# Patient Record
Sex: FEMALE | Employment: UNEMPLOYED | URBAN - METROPOLITAN AREA
[De-identification: names, ages, dates, MRNs, and addresses within clinical notes are randomized per-mention and may not be internally consistent; named-entity substitution may affect disease eponyms.]

---

## 2023-07-24 ENCOUNTER — HOSPITAL ENCOUNTER (OUTPATIENT)
Dept: GENERAL RADIOLOGY | Age: 48
Discharge: HOME | End: 2023-07-24
Attending: INTERNAL MEDICINE
Payer: MEDICAID

## 2023-07-24 ENCOUNTER — TELEPHONE (OUTPATIENT)
Dept: RHEUMATOLOGY | Facility: CLINIC | Age: 48
End: 2023-07-24

## 2023-07-24 ENCOUNTER — OFFICE VISIT (OUTPATIENT)
Dept: RHEUMATOLOGY | Facility: CLINIC | Age: 48
End: 2023-07-24
Payer: MEDICAID

## 2023-07-24 ENCOUNTER — HOSPITAL ENCOUNTER (OUTPATIENT)
Dept: GENERAL RADIOLOGY | Age: 48
End: 2023-07-24
Attending: INTERNAL MEDICINE
Payer: MEDICAID

## 2023-07-24 ENCOUNTER — LAB ENCOUNTER (OUTPATIENT)
Dept: LAB | Age: 48
End: 2023-07-24
Attending: INTERNAL MEDICINE
Payer: MEDICAID

## 2023-07-24 VITALS
RESPIRATION RATE: 16 BRPM | TEMPERATURE: 98 F | BODY MASS INDEX: 27.15 KG/M2 | DIASTOLIC BLOOD PRESSURE: 94 MMHG | SYSTOLIC BLOOD PRESSURE: 142 MMHG | WEIGHT: 173 LBS | HEIGHT: 67 IN | HEART RATE: 76 BPM

## 2023-07-24 DIAGNOSIS — L40.50 PSORIATIC ARTHRITIS (HCC): ICD-10-CM

## 2023-07-24 DIAGNOSIS — M79.18 MYOFASCIAL PAIN DYSFUNCTION SYNDROME: ICD-10-CM

## 2023-07-24 DIAGNOSIS — M79.18 DIFFUSE MYOFASCIAL PAIN SYNDROME: Primary | ICD-10-CM

## 2023-07-24 DIAGNOSIS — M79.18 MYOFASCIAL PAIN DYSFUNCTION SYNDROME: Primary | ICD-10-CM

## 2023-07-24 LAB
BILIRUB UR QL STRIP.AUTO: NEGATIVE
C3 SERPL-MCNC: 173 MG/DL (ref 90–180)
C4 SERPL-MCNC: 50.2 MG/DL (ref 10–40)
CK SERPL-CCNC: 45 U/L
COLOR UR AUTO: YELLOW
CREAT UR-SCNC: 132 MG/DL
CRP SERPL-MCNC: 0.32 MG/DL (ref ?–0.3)
ERYTHROCYTE [SEDIMENTATION RATE] IN BLOOD: 37 MM/HR
GLUCOSE UR STRIP.AUTO-MCNC: NEGATIVE MG/DL
HBV SURFACE AB SER QL: NONREACTIVE
HBV SURFACE AB SERPL IA-ACNC: <3.1 MIU/ML
HBV SURFACE AG SER-ACNC: 0.26 [IU]/L
HBV SURFACE AG SERPL QL IA: NONREACTIVE
KETONES UR STRIP.AUTO-MCNC: NEGATIVE MG/DL
LEUKOCYTE ESTERASE UR QL STRIP.AUTO: NEGATIVE
NITRITE UR QL STRIP.AUTO: NEGATIVE
PH UR STRIP.AUTO: 5 [PH] (ref 5–8)
PROT UR STRIP.AUTO-MCNC: NEGATIVE MG/DL
PROT UR-MCNC: 16.2 MG/DL
PROT/CREAT UR-RTO: 0.12
PTH-INTACT SERPL-MCNC: 33.9 PG/ML (ref 18.5–88)
RBC UR QL AUTO: NEGATIVE
RHEUMATOID FACT SERPL-ACNC: <10 IU/ML (ref ?–15)
SP GR UR STRIP.AUTO: 1.02 (ref 1–1.03)
T4 FREE SERPL-MCNC: 1.1 NG/DL (ref 0.8–1.7)
TSI SER-ACNC: 3.98 MIU/ML (ref 0.36–3.74)
URATE SERPL-MCNC: 5.5 MG/DL
UROBILINOGEN UR STRIP.AUTO-MCNC: <2 MG/DL
VIT B12 SERPL-MCNC: 906 PG/ML (ref 193–986)
VIT D+METAB SERPL-MCNC: 46.3 NG/ML (ref 30–100)

## 2023-07-24 PROCEDURE — 86200 CCP ANTIBODY: CPT | Performed by: INTERNAL MEDICINE

## 2023-07-24 PROCEDURE — 3077F SYST BP >= 140 MM HG: CPT | Performed by: INTERNAL MEDICINE

## 2023-07-24 PROCEDURE — 86334 IMMUNOFIX E-PHORESIS SERUM: CPT

## 2023-07-24 PROCEDURE — 82550 ASSAY OF CK (CPK): CPT

## 2023-07-24 PROCEDURE — 86225 DNA ANTIBODY NATIVE: CPT

## 2023-07-24 PROCEDURE — 86038 ANTINUCLEAR ANTIBODIES: CPT

## 2023-07-24 PROCEDURE — 73562 X-RAY EXAM OF KNEE 3: CPT | Performed by: INTERNAL MEDICINE

## 2023-07-24 PROCEDURE — 86235 NUCLEAR ANTIGEN ANTIBODY: CPT

## 2023-07-24 PROCEDURE — 86480 TB TEST CELL IMMUN MEASURE: CPT

## 2023-07-24 PROCEDURE — 84439 ASSAY OF FREE THYROXINE: CPT

## 2023-07-24 PROCEDURE — 99205 OFFICE O/P NEW HI 60 MIN: CPT | Performed by: INTERNAL MEDICINE

## 2023-07-24 PROCEDURE — 85610 PROTHROMBIN TIME: CPT

## 2023-07-24 PROCEDURE — 83970 ASSAY OF PARATHORMONE: CPT

## 2023-07-24 PROCEDURE — 3008F BODY MASS INDEX DOCD: CPT | Performed by: INTERNAL MEDICINE

## 2023-07-24 PROCEDURE — 86431 RHEUMATOID FACTOR QUANT: CPT

## 2023-07-24 PROCEDURE — 36415 COLL VENOUS BLD VENIPUNCTURE: CPT

## 2023-07-24 PROCEDURE — 82607 VITAMIN B-12: CPT

## 2023-07-24 PROCEDURE — 87340 HEPATITIS B SURFACE AG IA: CPT

## 2023-07-24 PROCEDURE — 86039 ANTINUCLEAR ANTIBODIES (ANA): CPT

## 2023-07-24 PROCEDURE — 86706 HEP B SURFACE ANTIBODY: CPT

## 2023-07-24 PROCEDURE — 86803 HEPATITIS C AB TEST: CPT

## 2023-07-24 PROCEDURE — 82570 ASSAY OF URINE CREATININE: CPT

## 2023-07-24 PROCEDURE — 81001 URINALYSIS AUTO W/SCOPE: CPT

## 2023-07-24 PROCEDURE — 72110 X-RAY EXAM L-2 SPINE 4/>VWS: CPT | Performed by: INTERNAL MEDICINE

## 2023-07-24 PROCEDURE — 86147 CARDIOLIPIN ANTIBODY EA IG: CPT

## 2023-07-24 PROCEDURE — 86140 C-REACTIVE PROTEIN: CPT

## 2023-07-24 PROCEDURE — 73523 X-RAY EXAM HIPS BI 5/> VIEWS: CPT | Performed by: INTERNAL MEDICINE

## 2023-07-24 PROCEDURE — 71046 X-RAY EXAM CHEST 2 VIEWS: CPT | Performed by: INTERNAL MEDICINE

## 2023-07-24 PROCEDURE — 82306 VITAMIN D 25 HYDROXY: CPT

## 2023-07-24 PROCEDURE — 84165 PROTEIN E-PHORESIS SERUM: CPT

## 2023-07-24 PROCEDURE — 86146 BETA-2 GLYCOPROTEIN ANTIBODY: CPT

## 2023-07-24 PROCEDURE — 85652 RBC SED RATE AUTOMATED: CPT

## 2023-07-24 PROCEDURE — 73130 X-RAY EXAM OF HAND: CPT | Performed by: INTERNAL MEDICINE

## 2023-07-24 PROCEDURE — 73630 X-RAY EXAM OF FOOT: CPT | Performed by: INTERNAL MEDICINE

## 2023-07-24 PROCEDURE — 86160 COMPLEMENT ANTIGEN: CPT

## 2023-07-24 PROCEDURE — 84156 ASSAY OF PROTEIN URINE: CPT

## 2023-07-24 PROCEDURE — 84443 ASSAY THYROID STIM HORMONE: CPT

## 2023-07-24 PROCEDURE — 3080F DIAST BP >= 90 MM HG: CPT | Performed by: INTERNAL MEDICINE

## 2023-07-24 PROCEDURE — 84550 ASSAY OF BLOOD/URIC ACID: CPT

## 2023-07-24 PROCEDURE — 72040 X-RAY EXAM NECK SPINE 2-3 VW: CPT | Performed by: INTERNAL MEDICINE

## 2023-07-24 RX ORDER — TRAZODONE HYDROCHLORIDE 100 MG/1
2 TABLET ORAL NIGHTLY PRN
COMMUNITY
Start: 2023-06-05

## 2023-07-24 RX ORDER — HYDROXYZINE PAMOATE 25 MG/1
1 CAPSULE ORAL 2 TIMES DAILY PRN
COMMUNITY
Start: 2023-06-05

## 2023-07-24 RX ORDER — HYDROCODONE BITARTRATE AND ACETAMINOPHEN 5; 325 MG/1; MG/1
1 TABLET ORAL EVERY 4 HOURS PRN
COMMUNITY
Start: 2023-04-15 | End: 2023-07-24

## 2023-07-24 RX ORDER — LAMOTRIGINE 100 MG/1
100 TABLET ORAL DAILY
COMMUNITY
Start: 2023-06-05

## 2023-07-24 RX ORDER — ARIPIPRAZOLE 5 MG/1
5 TABLET ORAL DAILY
COMMUNITY
Start: 2023-06-05

## 2023-07-24 RX ORDER — FLUOXETINE HYDROCHLORIDE 20 MG/1
20 CAPSULE ORAL DAILY
COMMUNITY
Start: 2023-06-05

## 2023-07-24 RX ORDER — METHYLPREDNISOLONE 4 MG/1
TABLET ORAL
Qty: 1 EACH | Refills: 0 | Status: SHIPPED | OUTPATIENT
Start: 2023-07-24

## 2023-07-24 NOTE — TELEPHONE ENCOUNTER
Would like to resume patient back on Cimzia she was on over a year ago from rheumatologist and [place]    For psoriatic arthritis    Updating  x-rays chest x-ray TB testing hepatitis serologies today

## 2023-07-24 NOTE — TELEPHONE ENCOUNTER
[facility] phoned office to clarify order knee. Would like to know if Dr. Padmini Akers ok with 2 view AP lateral or 3 views which includes patella. Spoke with Provider and requesting 3 view as ordered. Phoned Rad team back, no answer. [phone].

## 2023-07-24 NOTE — TELEPHONE ENCOUNTER
Please clarify if you would like to start a new load? Also please clarify ok for 400mg every 4 weeks?

## 2023-07-24 NOTE — PATIENT INSTRUCTIONS
OSTEOARTHRITIS    Fast Facts    Though some of the joint changes are irreversible, most patients will not need joint replacement surgery. OA symptoms (what you feel) can vary greatly among patients. A rheumatologist can detect arthritis and prescribe the proper treatment. The goal of treatment in OA is to reduce pain and improve function. Exercise is an important part of OA treatment, because it can decrease joint pain and improve function. At present, there is no treatment that can reverse the damage of OA in the joints. Researchers are trying to find ways to slow or reverse this joint damage. Osteoarthritis (also known as OA) is a common joint disease that most often affects middle-age to elderly people. It is commonly referred to as \"wear and tear\" of the joints, but we now know that OA is a disease of the entire joint, involving the cartilage, joint lining, ligaments, and bone. Although it is more common in older people, it is not really accurate to say that the joints are just \"wearing out. \" It is characterized by breakdown of the cartilage (the tissue that cushions the ends of the bones between joints), bony changes of the joints, deterioration of tendons and ligaments, and various degrees of inflammation of the joint lining (called the synovium). This arthritis tends to occur in the hand joints, spine, hips, knees, and great toes. The lifetime risk of developing OA of the knee is about 46%, and the lifetime risk of developing OA of the hip is 25%, according to the [facility], a long-term study from the [address] [place] [address] and sponsored by [employer] for [employer] and [employer] (often called the [employer]) and the [employer]. OA is a top cause of disability in older people. The goal of osteoarthritis treatment is to reduce pain and improve function.  There is no cure for the disease, but some treatments attempt to slow disease progression. What is osteoarthritis? OA is a frequently slowly progressive joint disease typically seen in middle-aged to elderly people. In osteoarthritis, the cartilage between the bones in the joint breaks down. This causes the affected bones to slowly get bigger. The joint cartilage often breaks down because of mechanical stress or biochemical changes within the body, causing the bone underneath to fail. OA can occur together with other types of arthritis, such as gout or rheumatoid arthritis. OA tends to affect commonly used joints such as the hands and spine, and the weight-bearing joints such as the hips and knees. Symptoms include:    Joint pain and stiffness    Knobby swelling at the joint    Cracking or grinding noise with joint movement    Decreased function of the joint    How do you treat osteoarthritis? There is no proven treatment yet that can reverse joint damage from OA. The goal of osteoarthritis treatment is to reduce pain and improve function of the affected joints. Most often, this is possible with a mixture of physical measures and drug therapy and, sometimes, surgery. Physical measures: Weight loss and exercise are useful in OA. Excess weight puts stress on your knee joints and hips and low back. For every 10 pounds of weight you lose over 10 years, you can reduce the chance of developing knee OA by up to 50 percent. Exercise can improve your muscle strength, decrease joint pain and stiffness, and lower the chance of disability due to OA. Also helpful are support (\"assistive\") devices, such as orthotics or a walking cane, that help you do daily activities. Heat or cold therapy can help relieve OA symptoms for a short time. Certain alternative treatments such as spa (hot tub), massage, and chiropractic manipulation can help relieve pain for a short time. They can be costly, though, and require repeated treatments.  Also, the long-term benefits of these alternative (sometimes called complementary or integrative) medicine treatments are unproven but are under study. Drug therapy: Forms of drug therapy include topical, oral (by mouth) and injections (shots). You apply topical drugs directly on the skin over the affected joints. These medicines include capsaicin cream, lidocaine and diclofenac gel. Oral pain relievers such as acetaminophen are common first treatments. So are nonsteroidal anti-inflammatory drugs (often called NSAIDs), which decrease swelling and pain. In 2010, the government (FDA) approved the use of duloxetine (Cymbalta) for chronic (long-term) musculoskeletal pain including from OA. This oral drug is not new. It also is in use for other health concerns, such as mood disorders, nerve pain and fibromyalgia. Patients with more serious pain may need stronger medications, such as prescription narcotics. Joint injections with corticosteroids (sometimes called cortisone shots) or with a form of lubricant called hyaluronic acid can give months of pain relief from OA. This lubricant is given in the knee, and these shots may help delay the need for a knee replacement by a few years in some patients. Surgery: Surgical treatment becomes an option for severe cases. This includes when the joint has serious damage, or when medical treatment fails to relieve pain and you have major loss of function. Surgery may involve arthroscopy, repair of the joint done through small incisions (cuts). If the joint damage cannot be repaired, you may need a joint replacement. Supplements: Many over-the-counter nutrition supplements have been used for osteoarthritis treatment. Most lack good research data to support their effectiveness and safety. Among the most widely used are calcium, vitamin D and omega-3 fatty acids. To ensure safety and avoid drug interactions, consult your doctor or pharmacist before using any of these supplements.  This is especially true when you are combining these supplements with prescribed     Fast Facts    Fibromyalgia affects two - four percent of people, women more often than men. Fibromyalgia is not an autoimmune or inflammation based illness, but research suggests the nervous system is involved. Doctors diagnose fibromyalgia based on all the patient's relevant symptoms (what you feel), no longer just on the number of tender places during an examination. There is no test to detect this disease, but you may need lab tests or X-rays to rule out other health problems. Though there is no cure, medications can reduce symptoms in some patients. Patients also may feel better with proper self-care, such as exercise and getting enough sleep. Fibromyalgia is a common neurologic health problem that causes widespread pain and tenderness (sensitivity to touch). The pain and tenderness tend to come and go, and move about the body. Most often, people with this chronic (long-term) illness are fatigued (very tired) and have sleep problems. The diagnosis can be made with a careful examination. Fibromyalgia is most common in women, though it can occur in men. It most often starts in middle adulthood, but can occur in the teen years and in old age. You are at higher risk for fibromyalgia if you have a rheumatic disease (health problem that affects the joints, muscles and bones). These include osteoarthritis, lupus, rheumatoid arthritis, or ankylosing spondylitis. What is fibromyalgia? What causes fibromyalgia? The causes of fibromyalgia are unclear. They may be different in different people. Current research suggests involvement of the nervous system, particularly the central nervous system (brain and spinal cord). Fibromyalgia is not from an autoimmune, inflammation, joint, or muscle disorder. Fibromyalgia may run in families.  There likely are certain genes that can make people more prone to getting fibromyalgia and the other health problems that can occur with it. Genes alone, though, do not cause fibromyalgia. There is most often some triggering factor that sets off fibromyalgia. It may be spine problems, arthritis, injury, or other type of physical stress. Emotional stress also may trigger this illness. The result is a change in the way the body \"talks\" with the spinal cord and brain. Levels of brain chemicals and proteins may change. More recently, Fibromyalgia has been described as Central Pain Amplification disorder, meaning the volume of pain sensation in the brain is turned up too high. Although Fibromyalgia can affect quality of life, it is still considered medically benign. It does not cause any heart attacks, stroke, cancer, physical deformities, or loss of life. How is fibromyalgia diagnosed? A doctor will suspect fibromyalgia based on your symptoms. Doctors may require that you have tenderness to pressure or tender points at a specific number of certain spots before saying you have fibromyalgia, but they are not required to make the diagnosis (see the Box). A physical exam can be helpful to detect tenderness and to exclude other causes of muscle pain. There are no diagnostic tests (such as X-rays or blood tests) for this problem. Yet, you may need tests to rule out another health problem that can be confused with fibromyalgia. Because widespread body pain is the main feature of fibromyalgia, health care providers will ask you to describe your pain. This may help tell the difference between fibromyalgia and other diseases with similar symptoms. Other conditions such as hypothyroidism (underactive thyroid gland) and polymyalgia rheumatica sometimes mimic fibromyalgia. Blood tests can tell if you have either of these problems. Sometimes, fibromyalgia is confused with rheumatoid arthritis or lupus.  But, again, there is a difference in the symptoms, physical findings and blood tests that will help your health care provider detect these health problems. Unlike fibromyalgia, these rheumatic diseases cause inflammation in the joints and tissues. Criteria Needed for a Fibromyalgia Diagnosis      1. Pain and symptoms over the past week, based on the total of number of painful areas out of 19 parts of the body plus level of severity of these symptoms:    a. Fatigue    b. Waking unrefreshed    c. Cognitive (memory or thought) problems    Plus number of other general physical symptoms    2. Symptoms lasting at least three months at a similar level    3. No other health problem that would explain the pain and other symptoms    How is fibromyalgia treated? There is no cure for fibromyalgia. However, symptoms can be treated with both non-drug and medication based treatments. Many times the best outcomes are achieved by using multiple types of treatments. Non-Drug Therapies: People with fibromyalgia should use non-drug treatments as well as any medicines their doctors suggest. Research shows that the most effective treatment for fibromyalgia is physical exercise. Physical exercise should be used in addition to any drug treatment. Patients benefit most from regular aerobic exercises. Other body-based therapies, including Kareem Chi and yoga, can ease fibromyalgia symptoms. Although you may be in pain, low impact physical exercise will not be harmful. Cognitive behavioral therapy is a type of therapy focused on understanding how thoughts and behaviors affect pain and other symptoms. CBT and related treatments, such as mindfulness, can help patients learn symptom reduction skills that lessen pain. Mindfulness is a non-spiritual meditation practice that cultivates present moment awareness. Mindfulness based stress reduction has been shown to significantly improve symptoms of fibromyalgia.     Other complementary and alternative therapies (sometimes called CAM or integrative medicine), such as acupuncture, chiropractic and massage therapy, can be useful to manage fibromyalgia symptoms. Many of these treatments, though, have not been well tested in patients with fibromyalgia. It is important to address risk factors and triggers for fibromyalgia including sleep disorders, such as sleep apnea, and mood problems such as stress, anxiety, panic disorder, and depression. This may require involvement of other specialists such as a Sleep Medicine doctor, Psychiatrist, and therapist.     Medications: The U.S. Food and Drug Administration has approved three drugs for the treatment of fibromyalgia. They include two drugs that change some of the brain chemicals (serotonin and norepinephrine) that help control pain levels: duloxetine (Cymbalta) and milnacipran (Savella). Older drugs that affect these same brain chemicals also may be used to treat fibromyalgia. These include amitriptyline (Elavil) and cyclobenzaprine (Flexeril). Other antidepressant drugs can be helpful in some patients. Side effects vary by the drug. Ask your doctor about the risks and benefits of your medicine. The other drug approved for fibromyalgia is pregabalin (Lyrica). Pregabalin and another drug, gabapentin (Neurontin), work by blocking the over activity of nerve cells involved in pain transmission. These medicines may cause dizziness, sleepiness, swelling and weight gain. It is strongly recommended to avoid opioid narcotic medications for treating fibromyalgia. The reason for this is that research evidence shows these drugs are not of helpful to most people with fibromyalgia, and will cause greater pain sensitivity or make pain persist. Tramadol (Ultram) may be used to treat fibromyalgia pain if short-term use of an opioid narcotic is needed. Over-the-counter medicines such as acetaminophen (Tylenol) or nonsteroidal anti-inflammatory drugs (commonly called NSAIDs) like ibuprofen (Advil, Motrin) or naproxen (Aleve, Anaprox) are not effective for fibromyalgia pain.  Yet, these drugs may be useful to treat the pain triggers of fibromyalgia. Thus, they are most useful in people who have other causes for pain such as arthritis in addition to fibromyalgia. For sleep problems, some of the medicines that treat pain also improve sleep. These include cyclobenzaprine (Flexeril), amitriptyline (Elavil), gabapentin (Neurontin) or pregabalin (Lyrica). It is not recommended that patients with fibromyalgia take sleeping medicines like zolpidem (Ambien) or benzodiazepine medications.

## 2023-07-25 ENCOUNTER — TELEPHONE (OUTPATIENT)
Dept: RHEUMATOLOGY | Facility: CLINIC | Age: 48
End: 2023-07-25

## 2023-07-25 LAB
ALBUMIN SERPL ELPH-MCNC: 5.3 G/DL (ref 3.75–5.21)
ALBUMIN/GLOB SERPL: 1.33 {RATIO} (ref 1–2)
ALPHA1 GLOB SERPL ELPH-MCNC: 0.33 G/DL (ref 0.19–0.46)
ALPHA2 GLOB SERPL ELPH-MCNC: 1.09 G/DL (ref 0.48–1.05)
B-GLOBULIN SERPL ELPH-MCNC: 1.25 G/DL (ref 0.68–1.23)
CCP IGG SERPL-ACNC: 1.6 U/ML (ref 0–6.9)
DSDNA IGG SERPL IA-ACNC: 3.2 IU/ML
ENA SS-A IGG SER IA-ACNC: <0.4 U/ML
ENA SS-B IGG SER IA-ACNC: 0.5 U/ML
GAMMA GLOB SERPL ELPH-MCNC: 1.34 G/DL (ref 0.62–1.7)
HCV AB: NON REACTIVE
PROT SERPL-MCNC: 9.3 G/DL (ref 6.4–8.2)

## 2023-07-25 NOTE — PROGRESS NOTES
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 There is severe joint space narrowing and marginal osteophyte formation at the 1st metatarsophalangeal joint. Remaining bony structures are intact. SOFT TISSUES:  Negative. No visible soft tissue swelling. EFFUSION:   None visible. OTHER:  Negative. There is severe arthritis of the first metatarsal joint    Impression  CONCLUSION:  There is advanced osteoarthritis at the 1st metatarsophalangeal joint. 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     Mild arthritis of the hip joints    Hand x-rays normal    She may benefit from seeing a podiatrist for her feet    Same plan from our end

## 2023-07-27 LAB
APTT: 23.6 SEC
B2 GLYCOPROT I IGG AB: <9 GPI IGG UNITS
B2 GLYCOPROT I IGM AB: <9 GPI IGM UNITS
CARDIOLIPIN IGG: 29 GPL U/ML
CARDIOLIPIN IGM: <9 MPL U/ML
DRVVT: 43.2 SEC
HEXAGONAL PHASE PHOSPHOLIPID: 0 SEC
INR: 1
M TB IFN-G CD4+ T-CELLS BLD-ACNC: 0.04 IU/ML
M TB TUBERC IFN-G BLD QL: NEGATIVE
M TB TUBERC IGNF/MITOGEN IGNF CONTROL: >10 IU/ML
NUCLEAR IGG TITR SER IF: POSITIVE {TITER}
PT: 10.8 SEC
QFT TB1 AG MINUS NIL: 0 IU/ML
QFT TB2 AG MINUS NIL: -0.04 IU/ML
THROMBIN TIME: 20.7 SEC

## 2023-07-28 LAB — ANA NUCLEOLAR TITR SER IF: 160 {TITER}

## 2023-11-27 ENCOUNTER — OFFICE VISIT (OUTPATIENT)
Dept: RHEUMATOLOGY | Facility: CLINIC | Age: 48
End: 2023-11-27
Payer: MEDICAID

## 2023-11-27 VITALS
WEIGHT: 170 LBS | RESPIRATION RATE: 16 BRPM | BODY MASS INDEX: 26.68 KG/M2 | SYSTOLIC BLOOD PRESSURE: 110 MMHG | OXYGEN SATURATION: 98 % | HEART RATE: 80 BPM | DIASTOLIC BLOOD PRESSURE: 62 MMHG | TEMPERATURE: 97 F | HEIGHT: 67 IN

## 2023-11-27 DIAGNOSIS — M79.18 MYOFASCIAL PAIN DYSFUNCTION SYNDROME: ICD-10-CM

## 2023-11-27 DIAGNOSIS — L40.50 PSORIATIC ARTHRITIS (HCC): Primary | ICD-10-CM

## 2023-11-27 DIAGNOSIS — M19.072 PRIMARY OSTEOARTHRITIS OF BOTH FEET: ICD-10-CM

## 2023-11-27 DIAGNOSIS — M19.071 PRIMARY OSTEOARTHRITIS OF BOTH FEET: ICD-10-CM

## 2023-11-27 PROCEDURE — 3078F DIAST BP <80 MM HG: CPT | Performed by: INTERNAL MEDICINE

## 2023-11-27 PROCEDURE — 99214 OFFICE O/P EST MOD 30 MIN: CPT | Performed by: INTERNAL MEDICINE

## 2023-11-27 PROCEDURE — 3074F SYST BP LT 130 MM HG: CPT | Performed by: INTERNAL MEDICINE

## 2023-11-27 PROCEDURE — 3008F BODY MASS INDEX DOCD: CPT | Performed by: INTERNAL MEDICINE

## 2023-11-27 RX ORDER — CERTOLIZUMAB PEGOL 200 MG/ML
200 INJECTION, SOLUTION SUBCUTANEOUS
COMMUNITY
Start: 2023-11-20 | End: 2023-11-27

## 2023-11-27 RX ORDER — CERTOLIZUMAB PEGOL 200 MG/ML
200 INJECTION, SOLUTION SUBCUTANEOUS
Qty: 4 EACH | Refills: 5 | Status: SHIPPED | OUTPATIENT
Start: 2023-11-27

## 2023-12-29 DIAGNOSIS — L40.50 PSORIATIC ARTHRITIS (HCC): Primary | ICD-10-CM

## 2023-12-29 NOTE — TELEPHONE ENCOUNTER
Patient has a new Pharmacy and therefore needs a new scrip sent to [facility].         LOV:  11/27/2023        Future Appointments   Date Time Provider Department Center   5/27/2024 12:00 PM Jannie Juarez MD [facility] [address]       LF:  11/27/2023    QTY:   4 each     Refills:   5    LABS:  CBC with Differential  Order: [id number]   suggestion  Information displayed in this report may not trend or trigger automated decision support.     Component  Ref Range & Units 3 mo ago   WBC  3.6 - 10.2 10ˆ3/µL 6.7   RBC  (Based on documented legal sex) 4.10-5.30 10ˆ6/µL 5.11   HGB  (Based on documented legal sex) 11.9-15.8 g/dL 15.6   HCT  (Based on documented legal sex) 37.4-48.3 % 46.4   MCV  82.0 - 99.0 fL 90.8   MCH  27.0 - 33.0 pg 30.5   MCHC  32.0 - 36.0 g/dL 33.6   RDW  11.0 - 15.0 % 12.5   PLT  150 - 450 10ˆ3/µL 313   MPV  9.8 - 12.7 fL 9.3 Low    NRBC's  0 % 0.0   Absolute NRBCs  0 10ˆ3/µL 0.0   Neutrophils  37.0 - 72.0 % 35.5 Low    Lymphocytes  16.0 - 48.0 % 54.5 High    Monocytes  4.0 - 14.0 % 5.8   Eosinophils  0.0 - 9.0 % 3.0   Basophils  0.0 - 2.0 % 0.9   Immature Granulocytes  No defined reference range % 0.3   Absolute Neutrophils  1.1 - 6.0 10ˆ3/µL 2.4   Absolute Lymphocytes  0.7 - 3.4 10ˆ3/µL 3.7 High    Absolute Monocytes  0.3 - 1.0 10ˆ3/µL 0.4   Absolute Eosinophils  0.0 - 0.6 10ˆ3/µL 0.2   Absolute Basophils  0.0 - 0.1 10ˆ3/µL 0.1   Absolute Immature Granulocytes  0.00 - 0.10 10ˆ3/µL 0.0   Narrative    9/6/2023 9:38 PM: P indicates partial results on a panel have been released. Additional results will follow.  9/6/2023 9:39 PM: This result has been final verified. No additional or changed results are expected.    Specimen Collected: 09/06/23  9:27 PM    Performed by: [facility] Last Resulted: 09/06/23  9:39 PM   Received From: [facility]  Result Received: 11/27/23 10:13 AM    View Encounter        Received Information   Contains abnormal data  Comp Metabolic Panel  Order: [id number]   suggestion  Information displayed in this report may not trend or trigger automated decision support.     Component  Ref Range & Units 3 mo ago   Sodium  133 - 146 mmol/L 140   Potassium  3.5 - 5.1 mmol/L 3.8   Chloride  98 - 107 mmol/L 103   Carbon Dioxide  21 - 31 mmol/L 27   Blood Urea Nitrogen  7 - 25 mg/dL 11   Creatinine  0.60 - 1.30 mg/dL 0.74   eGFRcr (CKD-EPI 2021)  >=60 mL/min/1.73 m² >90   Calcium  8.3 - 10.5 mg/dL 9.4   Glucose  70 - 100 mg/dL 117 High    Protein, Total  6.4 - 8.3 g/dL 8.4 High    Albumin  3.5 - 5.0 g/dL 4.7   ALT  9 - 43 units/L 62 High    Alkaline Phosphatase  34 - 104 units/L 80   AST  13 - 39 units/L 48 High    Bilirubin, Total  0.0 - 1.0 mg/dL 0.6   Anion Gap  4 - 13 mmol/L 10     Specimen Collected: 09/06/23  9:27 PM    Performed by: [facility] Last Resulted: 09/06/23  9:56 PM   Received From: [facility]  Result Received: 11/27/23 10:13 AM

## 2024-01-02 RX ORDER — CERTOLIZUMAB PEGOL 200 MG/ML
200 INJECTION, SOLUTION SUBCUTANEOUS
Qty: 4 EACH | Refills: 0 | Status: SHIPPED | OUTPATIENT
Start: 2024-01-02

## 2024-01-24 DIAGNOSIS — L40.50 PSORIATIC ARTHRITIS (HCC): ICD-10-CM

## 2024-01-24 RX ORDER — CERTOLIZUMAB PEGOL 200 MG/ML
2 INJECTION, SOLUTION SUBCUTANEOUS
Qty: 2 EACH | Refills: 3 | Status: SHIPPED | OUTPATIENT
Start: 2024-01-24

## 2024-01-24 NOTE — TELEPHONE ENCOUNTER
LOV:    11/27/2023      Future Appointments   Date Time Provider Department Center   5/27/2024 12:00 PM Jannie Juarez MD [facility] [address]       LF:  01/02/2024    QTY:  4 each   Refills:  0    LABS:      CBC with Differential  Order: [id number]   suggestion  Information displayed in this report may not trend or trigger automated decision support.     Component  Ref Range & Units 4 mo ago   WBC  3.6 - 10.2 10ˆ3/µL 6.7   RBC  (Based on documented legal sex) 4.10-5.30 10ˆ6/µL 5.11   HGB  (Based on documented legal sex) 11.9-15.8 g/dL 15.6   HCT  (Based on documented legal sex) 37.4-48.3 % 46.4   MCV  82.0 - 99.0 fL 90.8   MCH  27.0 - 33.0 pg 30.5   MCHC  32.0 - 36.0 g/dL 33.6   RDW  11.0 - 15.0 % 12.5   PLT  150 - 450 10ˆ3/µL 313   MPV  9.8 - 12.7 fL 9.3 Low    NRBC's  0 % 0.0   Absolute NRBCs  0 10ˆ3/µL 0.0   Neutrophils  37.0 - 72.0 % 35.5 Low    Lymphocytes  16.0 - 48.0 % 54.5 High    Monocytes  4.0 - 14.0 % 5.8   Eosinophils  0.0 - 9.0 % 3.0   Basophils  0.0 - 2.0 % 0.9   Immature Granulocytes  No defined reference range % 0.3   Absolute Neutrophils  1.1 - 6.0 10ˆ3/µL 2.4   Absolute Lymphocytes  0.7 - 3.4 10ˆ3/µL 3.7 High    Absolute Monocytes  0.3 - 1.0 10ˆ3/µL 0.4   Absolute Eosinophils  0.0 - 0.6 10ˆ3/µL 0.2   Absolute Basophils  0.0 - 0.1 10ˆ3/µL 0.1   Absolute Immature Granulocytes  0.00 - 0.10 10ˆ3/µL 0.0   Narrative    9/6/2023 9:38 PM: P indicates partial results on a panel have been released. Additional results will follow.  9/6/2023 9:39 PM: This result has been final verified. No additional or changed results are expected.    Specimen Collected: 09/06/23  9:27 PM    Performed by: [facility] LABORAT[facility] Last Resulted: 09/06/23  9:39 PM   Received From: [facility]  Result Received: 11/27/23 10:13 AM    View Encounter        Received Information   Contains abnormal data Comp Metabolic Panel  Order: [id number]   suggestion  Information displayed in this report may not  trend or trigger automated decision support.     Component  Ref Range & Units 4 mo ago   Sodium  133 - 146 mmol/L 140   Potassium  3.5 - 5.1 mmol/L 3.8   Chloride  98 - 107 mmol/L 103   Carbon Dioxide  21 - 31 mmol/L 27   Blood Urea Nitrogen  7 - 25 mg/dL 11   Creatinine  0.60 - 1.30 mg/dL 0.74   eGFRcr (CKD-EPI 2021)  >=60 mL/min/1.73 m² >90   Calcium  8.3 - 10.5 mg/dL 9.4   Glucose  70 - 100 mg/dL 117 High    Protein, Total  6.4 - 8.3 g/dL 8.4 High    Albumin  3.5 - 5.0 g/dL 4.7   ALT  9 - 43 units/L 62 High    Alkaline Phosphatase  34 - 104 units/L 80   AST  13 - 39 units/L 48 High    Bilirubin, Total  0.0 - 1.0 mg/dL 0.6   Anion Gap  4 - 13 mmol/L 10     Specimen Collected: 09/06/23  9:27 PM    Performed by: [facility] LABORATORY Last Resulted: 09/06/23  9:56 PM   Received From: [facility]  Result Received: 11/27/23 10:13 AM    View Encounter        Received Information

## 2024-01-30 ENCOUNTER — TELEPHONE (OUTPATIENT)
Dept: RHEUMATOLOGY | Facility: CLINIC | Age: 49
End: 2024-01-30

## 2024-05-28 ENCOUNTER — OFFICE VISIT (OUTPATIENT)
Dept: RHEUMATOLOGY | Facility: CLINIC | Age: 49
End: 2024-05-28

## 2024-05-28 VITALS
HEART RATE: 96 BPM | HEIGHT: 67 IN | RESPIRATION RATE: 18 BRPM | TEMPERATURE: 98 F | DIASTOLIC BLOOD PRESSURE: 84 MMHG | BODY MASS INDEX: 27.62 KG/M2 | WEIGHT: 176 LBS | SYSTOLIC BLOOD PRESSURE: 142 MMHG

## 2024-05-28 DIAGNOSIS — M12.812 ROTATOR CUFF ARTHROPATHY OF BOTH SHOULDERS: ICD-10-CM

## 2024-05-28 DIAGNOSIS — L40.50 PSORIATIC ARTHRITIS (HCC): Primary | ICD-10-CM

## 2024-05-28 DIAGNOSIS — M79.18 MYOFASCIAL PAIN DYSFUNCTION SYNDROME: ICD-10-CM

## 2024-05-28 DIAGNOSIS — M12.811 ROTATOR CUFF ARTHROPATHY OF BOTH SHOULDERS: ICD-10-CM

## 2024-05-28 DIAGNOSIS — M19.071 OSTEOARTHRITIS OF BOTH FEET, UNSPECIFIED OSTEOARTHRITIS TYPE: ICD-10-CM

## 2024-05-28 DIAGNOSIS — M19.072 OSTEOARTHRITIS OF BOTH FEET, UNSPECIFIED OSTEOARTHRITIS TYPE: ICD-10-CM

## 2024-05-28 DIAGNOSIS — M15.9 PRIMARY OSTEOARTHRITIS INVOLVING MULTIPLE JOINTS: ICD-10-CM

## 2024-05-28 DIAGNOSIS — Z79.899 ENCOUNTER FOR DRUG THERAPY: ICD-10-CM

## 2024-05-28 PROBLEM — M79.7 FIBROMYALGIA: Status: ACTIVE | Noted: 2024-05-28

## 2024-05-28 PROBLEM — M19.90 OSTEOARTHRITIS: Status: ACTIVE | Noted: 2024-05-28

## 2024-05-28 PROCEDURE — 99214 OFFICE O/P EST MOD 30 MIN: CPT | Performed by: INTERNAL MEDICINE

## 2024-05-28 RX ORDER — METHYLPREDNISOLONE 4 MG/1
TABLET ORAL
Qty: 1 EACH | Refills: 0 | Status: SHIPPED | OUTPATIENT
Start: 2024-05-28

## 2024-05-28 RX ORDER — CERTOLIZUMAB PEGOL 400 MG
400 KIT SUBCUTANEOUS
Qty: 1 KIT | Refills: 5 | Status: SHIPPED | OUTPATIENT
Start: 2024-05-28

## 2024-05-28 RX ORDER — METHYLPREDNISOLONE 4 MG/1
TABLET ORAL
Qty: 1 EACH | Refills: 0 | Status: SHIPPED | OUTPATIENT
Start: 2024-05-28 | End: 2024-05-28

## 2024-05-28 NOTE — PROGRESS NOTES
[facility], [address] [place] [address]      Consult     Trish Motta Patient Status:  No patient class for patient encounter    5/15/1975 MRN [id number]   Location [facility], [address] [place] [address] Attending No att. providers found   Hosp Day # 0 PCP No primary care provider on file.     Referring Provider: PCP    Reason for Consultation: Psoriatic arthritis; fibromyalgia    Subjective:    Trish Motta is a 49 year old female with  diagnosed with psoriatic arthritis      Saw Dr Price in [place] approx 6 ago     Talz first one year good for skin but not joints     Humira was 2-3 years worked for a few years     Cimzia doing better than Humira no psorasis but pain and swelling     Now off a for year with recurrence of her psoriasis     Psoriasis, elbows, nose; sot pustular on chest, hands low thigh     Nothing on back as far as seen     Remotely in the past her CARLOS was positive but everything else was negative     No recent chest pain     No shortness or chest pain     Frequency and urgency of urination and history of endometriosis     Not seeing anyone yet      No history of DVT, TIA, seizures migraines     Periodic abdominal pain with diarrhea and constipation with hx of IBS     Hx of abdominal surgery approximately 6 years ago and then 4 years and did adhesion lysis surgery     No colostomy was done     Has had colonoscopy ?     History of depression/anxiety insomnia seeing someone every month half      Seeing psych. Nonrestorative sleep; fatigue chronic      Generalized achiness, burning shooting sometimes; no swelling that is visual     She was seen as a new patient 2023  She had mild synovitis in exam resume Cimzia injection after loading dose with overall improvement  X-rays of the hands and feet were reviewed showing most generally degenerative arthritis of the first MTP joint of the feet but otherwise x-rays were normal  Lumbar spine cervical thoracic x-rays  were also normal  Knee and pelvic x-ray showed mild osteoarthritis  Patient states overall improvement in pain no psoriasis  She was also diagnosed with 5 myofascial pain syndrome/fibromyalgia.  She has been actively seeing her psychiatrist  She has been switched to Abilify with overall improvement along with trazodone 200 mg at bedtime along with fluoxetine and Lamictal    Her main source of pain is her MTP joint which did seem to show severe arthritis.  I have recommended seeing a podiatrist for other options.    Patient was last seen in clinic November 2023     She had no flareups.  Continues to have episodes of generalized pain is trying to exercise has some mild shoulder pain.  Nothing that is too concerning    She is continued her Cimzia injections she takes 400 mg subcu a month    She is willing to proceed with podiatry referral internally for periodic first MTP joint pain x-rays of the feet as above showing moderate to severe arthritis.  No swelling of her joints otherwise    He is followed by psychiatrist regularly every 3 months no changes in her medications       History/Other:        Past Medical History:  Past Medical History:    Osteoarthritis    Psoriatic arthritis (HCC)        Past Surgical History:   Past Surgical History:   Procedure Laterality Date    Other surgical history      ovarian tumor    Other surgical history      abdominal abscess    Other surgical history      ovarian abscess       Social History:  reports that she has quit smoking. Her smoking use included cigarettes. She has never used smokeless tobacco. She reports current alcohol use. She reports that she does not use drugs.    Family History: History reviewed. No pertinent family history.    Allergies:   Allergies   Allergen Reactions    Morphine OTHER (SEE COMMENTS)     Major headache lasting several days       Current Medications:  Current Outpatient Medications   Medication Sig Dispense Refill    ARIPiprazole 2 MG Oral Tab        FLUoxetine 10 MG Oral Cap       methylPREDNISolone (MEDROL) 4 MG Oral Tablet Therapy Pack As directed. 1 each 0    Certolizumab Pegol (CIMZIA) 2 X 200 MG Subcutaneous Kit Inject 400 mg into the skin every 28 days. 1 kit 5    Certolizumab Pegol (CIMZIA) 2 X 200 MG/ML Subcutaneous Prefilled Syringe Kit Inject 2 Syringes into the skin every 28 days. 2 each 3    ARIPiprazole 5 MG Oral Tab Take 1 tablet (5 mg total) by mouth daily.      FLUoxetine 20 MG Oral Cap Take 1 capsule (20 mg total) by mouth daily.      traZODone 100 MG Oral Tab Take 2 tablets (200 mg total) by mouth nightly as needed.      lamoTRIgine 100 MG Oral Tab Take 1 tablet (100 mg total) by mouth daily.        No current facility-administered medications for this visit.       (Not in a hospital admission)      Review of Systems:     Constitutional: Negative for chills, , occasional fatigue, fever and unexpected weight change.    HENT: Negative for congestion, and mouth sores.    Eyes: Negative for photophobia, pain, redness and visual disturbance.    Respiratory: Negative for apnea, cough, chest tightness, shortness of breath, wheezing and stridor.    Cardiovascular: Negative for chest pain, palpitations and leg swelling.    Gastrointestinal: Negative for abdominal distention, abdominal pain, blood in stool, constipation, diarrhea and nausea.    Endocrine: Negative.     Genitourinary: Negative for decreased urine volume, difficulty urinating, dyspareunia, dysuria, flank pain, and frequency.    Musculoskeletal: +arthralgias, no gait problem and joint swelling.    Skin: Negative for color change, pallor and rash. No raynauds or digital ulcerations no sclerodactly.    Allergic/Immunologic: Negative.    Neurological: Negative for dizziness, tremors, seizures, syncope, speech difficulty, weakness, light-headedness, numbness and headaches.    Hematological: Does not bruise/bleed easily.    Psychiatric/Behavioral: Negative for confusion, decreased  concentration, hallucinations, self-injury, sleep disturbance and suicidal ideas or depression.    Objective:   [unfilled]  Vitals:    05/28/24 0945   BP: 142/84   Pulse: 96   Resp: 18   Temp: 98.2 °F (36.8 °C)          Constitutional: is oriented to person, place, and time. Appears well-developed and well-nourished. No distress.    HEENT: Normocephalic; EOMI; no jvd; no LAD; no oral or nasal ulcers.     Eyes: Conjunctivae and EOM are normal. Pupils are equal, round, and reactive to light.     Neck: Normal range of motion. No thyromegaly present.    Cardiovascular: RRR, no murmurs.    Lungs: Clear, Bilateral air entry, no wheezes.    Abdominal: Soft.    Musculoskeletal:         Joint Exam 05/28/2024        Right  Left   Sternoclavicular   Tender   Tender   Acromioclavicular   Tender   Tender   Glenohumeral   Tender   Tender   Elbow   Tender   Tender   Lumbar Spine   Tender      Sacroiliac   Tender   Tender   Hip   Tender   Tender        Swollen: 0  0    Tender: 13  0        Right shoulder: Exhibits normal range of motion on abduction and internal rotation, mild tenderness, no bony tenderness, no deformity, no laceration, no pain and no spasm.        Left shoulder: Exhibits normal range of motion on abduction and internal and external rotation.  mild tenderness, no bony tenderness, no swelling, no effusion, no deformity, no pain, no spasm and normal strength.        Right elbow:  Exhibits normal range of motion, no swelling, no effusion and no deformity. No tenderness found. No medial epicondyle, no lateral epicondyle and no olecranon process tenderness noted. There are no contractures or tophi or nodules.        Left elbow:  Normal range of motion, no swelling, no effusion and no deformity. No medial epicondyle, no lateral epicondyle and no olecranon process tenderness noted. There are no contractures or tophi or nodules.        Right wrist:  Exhibits normal range of motion, no tenderness, no bony tenderness,  no swelling, no effusion and no crepitus. Flexion and extension intact w/o limitation.        Left wrist: Exhibits normal range of motion, no tenderness, no bony tenderness, no swelling, no effusion, no crepitus and no deformity. Flexion and extension intact without limitation.        Right hip: Exhibits normal range of motion, normal strength, no tenderness, no bony tenderness, no swelling and no crepitus.        Right hand: No synovitis of MCP,PIP or DIP joints; no Bouchards or Heberden nodules noted;  strength: 100%.        Left hand: No synovitis of MCP,PIP or DIP joints; no Bouchards or Heberden nodules noted;  strength: 100%.        Left hip: Exhibits normal range of motion, normal strength, no tenderness, no bony tenderness, No swelling and no crepitus.        Right knee: Exhibits normal range of motion, no swelling, no effusion, no ecchymosis, no deformity and no erythema. No tenderness found. No medial joint line, no lateral joint line, no MCL and no LCL tenderness noted. mild crepitation on flexion of knee and extension normal.        Left knee:  Exhibits normal range of motion, no swelling, no effusion, no ecchymosis and no erythema. No tenderness found. No medial joint line, no lateral joint line and no patellar tendon tenderness noted. mild crepitation on flexion of the knee. Extension intact and normal.        Right ankle: No swelling, no deformity. No tenderness. Dorsiflexion and plantar flexion intact without limitation in range of motion.        Left ankle: Exhibits no swelling. No tenderness. No lateral malleolus and no medial malleolus tenderness found. Achilles tendon normal. Achilles tendon exhibits no pain, no defect and normal Bravo's test results.  Dorsiflexion and plantar flexion intact without limitation in range of motion.        Cervical back: Exhibits normal range of motion, no tenderness, no bony tenderness, no swelling, no pain and no spasm.        Thoracic back: Exhibits  normal range of motion, no tenderness, no bony tenderness and no spasm.        Lumbar back:  Exhibits normal range of motion, + tenderness, no bony tenderness, no pain and +spasm.        Right foot: normal. There is normal range of motion, no tenderness, no bony tenderness, no crepitus and no laceration. There is no synovitis or tenderness of the MTP joints to palpation.  Significant bony enlargement of the first MTP joint        Left foot: normal. There is normal range of motion, no tenderness, no bony tenderness and no crepitus. There is no synovitis or tenderness of the MTP joints to palpation.  Significant bony enlargement of the first MTP joint    Lymphadenopathy: No submental, no submandibular, and no occipital adenopathy present, has no cervical adenopathy or axillary lympadenopathy.    Neurological: Alert and oriented. No focal motor or sensory abnormalities. Strength is 5/5 Upper Extremities/Lower Extremities proximally and distally.    Skin: Skin is warm, dry and intact.    Psychiatric: Normal behavior.    Results:    Labs:      No results found for: \"WBC\", \"RBC\", \"HGB\", \"HCT\", \"MCV\", \"MCH\", \"MCHC\", \"RDW\", \"PLT\", \"MPV\", \"LYMPHOCYTES\", \"NEUT\"    No components found for: \"RELY\", \"NMET\", \"MYEL\", \"PROMY\", \"VELMA\", \"ABSNEUTS\", \"ABSBANDS\", \"ABMM\", \"ABMY\", \"ABPM\", \"ABBL\"      Lab Results   Component Value Date    ALB 5.30 (H) 07/24/2023          No components found for: \"ESRWESTERGRN\"       Lab Results   Component Value Date    CRP 0.32 (H) 07/24/2023         Lab Results   Component Value Date    CLARITY Hazy (A) 07/24/2023    UROBILINOGEN <2.0 07/24/2023     @LABRCNTIP(RF,B12)@      [unfilled]    Imaging:  No results found.  PROCEDURE:  XR LUMBAR SPINE (MIN 4 VIEWS) (CPT=72110)     TECHNIQUE:  AP, lateral, oblique, and coned down L5-S1 views were obtained.     COMPARISON:  None.     INDICATIONS:  M79.18 Myofascial pain dysfunction syndrome     PATIENT STATED HISTORY: (As transcribed by Technologist)  Pt has  psoriatic arthritis with general joint pain, worse on her feet and her lower back.  She just moved to [place] so these are baseline xrays prior to starting medication.         FINDINGS:      BONES:  Normal.  No significant spondylosis, scoliosis, fracture, or visible bony lesion.  DISC SPACES:  Normal.  No significant disc height narrowing, subluxation, or endplate abnormality.  PARASPINOUS:  Negative.  No paraspinous abnormality is seen.  OTHER:  Negative.                     Impression   CONCLUSION:  Unremarkable lumbar spine radiographs.     Narrative   PROCEDURE:  XR CERVICAL SPINE (2 VIEWS) (CPT=72040)     COMPARISON:  None.     INDICATIONS:  M79.18 Myofascial pain dysfunction syndrome     PATIENT STATED HISTORY: (As transcribed by Technologist)  Pt has psoriatic arthritis with general joint pain, worse on her feet and her lower back.  She just moved to [place] so these are baseline xrays prior to starting medication.         FINDINGS:      BONES:  Normal.  No significant spondylosis, scoliosis, fracture, or visible bony lesion.  DISC SPACES:  There is mild disc space narrowing at C5-C6 with mild endplate osteophyte formation.  PARASPINOUS:  Negative.  No paraspinous abnormality is seen.  OTHER:  Negative.                     Impression   CONCLUSION:  There is mild degenerative disc disease at C5-C6.     PROCEDURE:  XR KNEE ROUTINE (3 VIEWS), LEFT (CPT=73562)     TECHNIQUE:  Three views were obtained including patellar view.     COMPARISON:  None.     INDICATIONS:     PATIENT STATED HISTORY: (As transcribed by Technologist)  Pt has psoriatic arthritis with general joint pain, worse on her feet and her lower back.  She just moved [place] so these are baseline xrays prior to starting medication.          FINDINGS:    BONES:  Normal.  No significant arthropathy or acute abnormality.  SOFT TISSUES:  Negative.  No visible soft tissue swelling.  EFFUSION:  None visible.  OTHER:  Negative.                   Impression    CONCLUSION:  Unremarkable radiographs of left knee.        Narrative   PROCEDURE:  XR KNEE ROUTINE (3 VIEWS), RIGHT (CPT=73562)     TECHNIQUE:  Three views were obtained including patellar view.     COMPARISON:  None.     INDICATIONS:     PATIENT STATED HISTORY: (As transcribed by Technologist)  Pt has psoriatic arthritis with general joint pain, worse on her feet and her lower back.  She just moved [place] so these are baseline xrays prior to starting medication.          FINDINGS:    BONES:  Normal.  No significant arthropathy or acute abnormality.  SOFT TISSUES:  Negative.  No visible soft tissue swelling.  EFFUSION:  None visible.  OTHER:  Negative.                   Impression   CONCLUSION:  Unremarkable right knee radiographs.     NDICATIONS:  M79.18 Myofascial pain dysfunction syndrome     PATIENT STATED HISTORY: (As transcribed by Technologist)  Pt has psoriatic arthritis with general joint pain, worse on her feet and her lower back.  She just moved to [place] so these are baseline xrays prior to starting medication.         FINDINGS:  There is no acute fracture.  Joint spaces are preserved.  Soft tissues are unremarkable.                   Impression   CONCLUSION:  Unremarkable radiographs of both hands.        LOCATION:  [facility]        Dictated by (CST): Triston Fonseca MD on 7/24/2023 at 3:27 PM       Narrative   PROCEDURE:  XR FOOT, COMPLETE (MIN 3 VIEWS), BILAT (CPT=73630-50)     INDICATIONS:  M79.18 Myofascial pain dysfunction syndrome     COMPARISON:  None.     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  There is severe joint space narrowing and marginal osteophyte formation at the 1st metatarsophalangeal joint.  Remaining bony structures are intact.  SOFT TISSUES:  Negative.  No visible soft tissue swelling.  EFFUSION:   None visible.  OTHER:  Negative.                   Impression   CONCLUSION:  There is advanced osteoarthritis at the 1st metatarsophalangeal joint.  There is a  type 2 accessory navicular bone noted.     INDINGS:    LUNGS:  No focal consolidation.  Normal vascularity.  CARDIAC:  Normal size cardiac silhouette.  MEDIASTINUM:  Normal.  PLEURA:  Normal.  No pleural effusions.  BONES:  Normal for age.                   Impression   CONCLUSION:  There is no evidence of active cardiopulmonary disease.     Narrative   PROCEDURE:  XR HIP W OR WO PELVIS (MIN 5 VIEWS), BILAT (CPT=73523)     TECHNIQUE:  Bilateral min 5 views of the hip and pelvis if performed.     COMPARISON:  None.     INDICATIONS:  M79.18 Myofascial pain dysfunction syndrome     PATIENT STATED HISTORY: (As transcribed by Technologist)  Pt has psoriatic arthritis with general joint pain, worse on her feet and her lower back.  She just moved [place] so these are baseline xrays prior to starting medication.         FINDINGS:    BONES:  There is mild joint space narrowing noted in both hips.  There is no fracture.  SOFT TISSUES:  Negative.  No visible soft tissue swelling.  EFFUSION:  None visible.  OTHER:  Negative.                   Impression   CONCLUSION:  There is mild osteoarthritis in both hips.        LOCATION:  [facility]        Dictated by (CST): Triston Fonseca MD on 7/24/2023 at 3:29 PM      Finalized by (CST): Triston Fonseca MD on 7/24/2023 at 3:29 PM       Result History      Assessment & Plan:      49-year-old woman comes in for further evaluation for polyarthralgias history of psoriatic arthritis     Suspect myofascial pain syndrome/fibromyalgia with tender points neuropathy depression anxiety insomnia chronic fatigue possible signs of IBS  Osteoarthritis  Psoriatic arthritis   Moderate to severe arthritis of the first MTP joint bilateral feet  Mild rotator cuff tendinitis     Patient has no further synovitis on exam  No further psoriasis  Continue Cimzia 400 mg subcu month  X-rays of the hands chest x-ray normal  There are no erosions on x-ray  Recommend seeing a podiatrist for severe OA of the first MTP joint  bilateral feet for further options and surgical options and controlling pain  Will put in referral for internal podiatrist  Has failed Humira in the past as well as talz  which only helped her skin not her joints    TB testing hepatitis serologies normal July 2023 update TB testing and labs today    We will send orders for Cimzia injections     Discussed the diagnosis of myofascial pain syndrome/fibromyalgia written information given to the patient.  She is seeing a psychiatrist every month loosely for her depression anxiety PTSD insomnia; she is followed closely by psychiatrist recently switched to Abilify along with trazodone fluoxetine and Lamictal     Unfortunately I would have minimal to offer from a chronic pain perspective.  Discussed the importance of managing and controlling her symptoms as above for improvement of her overall pain levels.  Could consider gabapentin Lyrica through psychiatrist along with her under additional psychotropic medications.  She states she had side effects to Cymbalta in the past.      Education and counseling provided to patient.  Instructed patient to call my office or seek medical attention immediately if symptoms worsen. Risks and side effects of medications and diagnosis discussed in detail and patient was given written information on new prescribed medications.    Return to clinic:  Return in about 1 year (around 5/28/2025).    Jannie Juarez MD  11/27/2023

## 2024-06-04 ENCOUNTER — TELEPHONE (OUTPATIENT)
Dept: RHEUMATOLOGY | Facility: CLINIC | Age: 49
End: 2024-06-04

## 2024-06-04 DIAGNOSIS — L40.50 PSORIATIC ARTHRITIS (HCC): Primary | ICD-10-CM

## 2024-06-04 DIAGNOSIS — M79.18 MYOFASCIAL PAIN DYSFUNCTION SYNDROME: ICD-10-CM

## 2024-06-04 RX ORDER — METHYLPREDNISOLONE 4 MG/1
TABLET ORAL
Qty: 1 EACH | Refills: 0 | Status: SHIPPED | OUTPATIENT
Start: 2024-06-04

## 2024-06-04 NOTE — TELEPHONE ENCOUNTER
Can we please send patient's Medrol Dose pack to the [facility] for her. It was sent to the wrong pharmacy and patient never received the script.

## 2024-11-13 DIAGNOSIS — M15.0 PRIMARY OSTEOARTHRITIS INVOLVING MULTIPLE JOINTS: ICD-10-CM

## 2024-11-13 DIAGNOSIS — M19.072 OSTEOARTHRITIS OF BOTH FEET, UNSPECIFIED OSTEOARTHRITIS TYPE: ICD-10-CM

## 2024-11-13 DIAGNOSIS — M19.071 OSTEOARTHRITIS OF BOTH FEET, UNSPECIFIED OSTEOARTHRITIS TYPE: ICD-10-CM

## 2024-11-13 DIAGNOSIS — M79.18 MYOFASCIAL PAIN DYSFUNCTION SYNDROME: ICD-10-CM

## 2024-11-13 DIAGNOSIS — L40.50 PSORIATIC ARTHRITIS (HCC): ICD-10-CM

## 2024-11-13 DIAGNOSIS — Z79.899 ENCOUNTER FOR DRUG THERAPY: ICD-10-CM

## 2024-11-13 DIAGNOSIS — M12.811 ROTATOR CUFF ARTHROPATHY OF BOTH SHOULDERS: ICD-10-CM

## 2024-11-13 DIAGNOSIS — M12.812 ROTATOR CUFF ARTHROPATHY OF BOTH SHOULDERS: ICD-10-CM

## 2024-11-13 RX ORDER — CERTOLIZUMAB PEGOL 200 MG/ML
2 INJECTION, SOLUTION SUBCUTANEOUS
Qty: 1 EACH | Refills: 0 | Status: SHIPPED | OUTPATIENT
Start: 2024-11-13

## 2024-11-13 NOTE — TELEPHONE ENCOUNTER
LOV: 05/28/2024    Future Appointments   Date Time Provider Department Center   5/28/2025  9:40 AM Jannie Juarez MD [facility] [address]       LF: 05/28/2024     QTY: 1 kit     Refills: 5    LABS: No CBC    COMPREHENSIVE METABOLIC PANEL  Order: [id number]  Component  Ref Range & Units 6/19/24 10:54 AM   GLUCOSE, SERUM  mg/dL CANCELED   Comment: Test not performed.  Serum was in contact with cells when received  which will make the result inaccurate.    Result canceled by the ancillary.   BUN  6 - 24 mg/dL 12   CREATININE, SERUM  0.57 - 1.00 mg/dL 0.65   eGFR  >59 mL/min/1.73 107   BUN/CREATININE RATIO  9 - 23 18   SODIUM, SERUM  134 - 144 mmol/L 141   POTASSIUM, SERUM  mmol/L CANCELED   Comment: Test not performed.  Serum was in contact with cells when received  which will make the result inaccurate.    Result canceled by the ancillary.   CHLORIDE, SERUM  96 - 106 mmol/L 101   CARBON DIOXIDE, TOTAL  20 - 29 mmol/L 23   CALCIUM, SERUM  8.7 - 10.2 mg/dL 9.5   PROTEIN, TOTAL, SERUM  6.0 - 8.5 g/dL 7.3   ALBUMIN, SERUM  3.9 - 4.9 g/dL 4.2   GLOBULIN, TOTAL  1.5 - 4.5 g/dL 3.1   BILIRUBIN, TOTAL  0.0 - 1.2 mg/dL 0.5   ALKALINE PHOSPHATASE, SERUM  44 - 121 IU/L 105   AST (SGOT)  0 - 40 IU/L 57 High    ALT (SGPT)  0 - 32 IU/L 77 High    Resulting Agency [facility]     Specimen Collected: 06/19/24 10:54 AM    Performed by: [facility] Last Resulted: 06/20/24  6:09 AM   Received From: [facility]  Result Received: 11/13/24  8:58 AM

## 2024-12-09 DIAGNOSIS — M15.0 PRIMARY OSTEOARTHRITIS INVOLVING MULTIPLE JOINTS: ICD-10-CM

## 2024-12-09 DIAGNOSIS — M12.811 ROTATOR CUFF ARTHROPATHY OF BOTH SHOULDERS: ICD-10-CM

## 2024-12-09 DIAGNOSIS — M19.072 OSTEOARTHRITIS OF BOTH FEET, UNSPECIFIED OSTEOARTHRITIS TYPE: ICD-10-CM

## 2024-12-09 DIAGNOSIS — L40.50 PSORIATIC ARTHRITIS (HCC): ICD-10-CM

## 2024-12-09 DIAGNOSIS — M79.18 MYOFASCIAL PAIN DYSFUNCTION SYNDROME: ICD-10-CM

## 2024-12-09 DIAGNOSIS — M12.812 ROTATOR CUFF ARTHROPATHY OF BOTH SHOULDERS: ICD-10-CM

## 2024-12-09 DIAGNOSIS — Z79.899 ENCOUNTER FOR DRUG THERAPY: ICD-10-CM

## 2024-12-09 DIAGNOSIS — M19.071 OSTEOARTHRITIS OF BOTH FEET, UNSPECIFIED OSTEOARTHRITIS TYPE: ICD-10-CM

## 2024-12-10 RX ORDER — CERTOLIZUMAB PEGOL 200 MG/ML
2 INJECTION, SOLUTION SUBCUTANEOUS
Qty: 1 EACH | Refills: 3 | Status: SHIPPED | OUTPATIENT
Start: 2024-12-10

## 2024-12-10 NOTE — TELEPHONE ENCOUNTER
LOV: 05/28/2024    Future Appointments   Date Time Provider Department Center   5/28/2025  9:40 AM Jannie Juarez MD [facility] [address]       LF: 11/29/2024     QTY: 1 each     Refills: 0    LABS: BLOOD COUNT COMPLETE AUTO&AUTO DIFRNTL WBC  Order: [id number]   suggestion  Information displayed in this report may not trend or trigger automated decision support.     Component  Ref Range & Units 5 mo ago   WHITE BLOOD CELL(WBC) COUNT  3.4 - 10.8 x10E3/uL 5.9   RED BLOOD CELL (RBC) COUNT  3.77 - 5.28 x10E6/uL 4.71   HEMOGLOBIN  11.1 - 15.9 g/dL 14.3   HEMATOCRIT  34.0 - 46.6 % 43.2   MCV  79 - 97 fL 92   MCH  26.6 - 33.0 pg 30.4   MCHC  31.5 - 35.7 g/dL 33.1   RDW  11.7 - 15.4 % 13.3   PLATELETS  150 - 450 x10E3/uL 301   NEUTROPHILS  Not Estab. % 40   LYMPHS  Not Estab. % 49   MONOCYTES  Not Estab. % 7   EOS  Not Estab. % 3   BASOPHILS  Not Estab. % 1   NEUTROPHILS (ABSOLUTE)  1.4 - 7.0 x10E3/uL 2.3   LYMPHS (ABSOLUTE)  0.7 - 3.1 x10E3/uL 2.9   MONOCYTES(ABSOLUTE)  0.1 - 0.9 x10E3/uL 0.4   EOS (ABSOLUTE)  0.0 - 0.4 x10E3/uL 0.2   BASO (ABSOLUTE)  0.0 - 0.2 x10E3/uL 0.1   IMMATURE GRANULOCYTES  Not Estab. % 0   IMMATURE GRANS (ABS)  0.0 - 0.1 x10E3/uL 0.0     Specimen Collected: 06/19/24 10:54 AM    Performed by: [facility] Last Resulted: 06/20/24  2:07 AM   Received From: [facility]  Result Received: 11/13/24  8:58 AM    View Encounter        Received Information   Contains abnormal data COMPREHENSIVE METABOLIC PANEL  Order: [id number]   suggestion  Information displayed in this report may not trend or trigger automated decision support.     Component  Ref Range & Units 5 mo ago   GLUCOSE, SERUM  mg/dL CANCELED   Comment: Test not performed.  Serum was in contact with cells when received  which will make the result inaccurate.    Result canceled by the ancillary.   BUN  6 - 24 mg/dL 12   CREATININE, SERUM  0.57 - 1.00 mg/dL 0.65   eGFR  >59 mL/min/1.73 107   BUN/CREATININE RATIO  9 - 23 18   SODIUM, SERUM  134 - 144  mmol/L 141   POTASSIUM, SERUM  mmol/L CANCELED   Comment: Test not performed.  Serum was in contact with cells when received  which will make the result inaccurate.    Result canceled by the ancillary.   CHLORIDE, SERUM  96 - 106 mmol/L 101   CARBON DIOXIDE, TOTAL  20 - 29 mmol/L 23   CALCIUM, SERUM  8.7 - 10.2 mg/dL 9.5   PROTEIN, TOTAL, SERUM  6.0 - 8.5 g/dL 7.3   ALBUMIN, SERUM  3.9 - 4.9 g/dL 4.2   GLOBULIN, TOTAL  1.5 - 4.5 g/dL 3.1   BILIRUBIN, TOTAL  0.0 - 1.2 mg/dL 0.5   ALKALINE PHOSPHATASE, SERUM  44 - 121 IU/L 105   AST (SGOT)  0 - 40 IU/L 57 High    ALT (SGPT)  0 - 32 IU/L 77 High      Specimen Collected: 06/19/24 10:54 AM    Performed by: [facility] Last Resulted: 06/20/24  6:09 AM   Received From: [facility]  Result Received: 11/13/24  8:58 AM

## 2025-01-13 ENCOUNTER — TELEPHONE (OUTPATIENT)
Dept: RHEUMATOLOGY | Facility: CLINIC | Age: 50
End: 2025-01-13

## 2025-01-13 NOTE — TELEPHONE ENCOUNTER
Received fax from [facility] requesting enrollment form.     Faxed signed completed form for Cimzia 200mg PFS (Inject 400mg subcutaneous every 4 weeks) to [phone]

## 2025-04-22 DIAGNOSIS — M12.812 ROTATOR CUFF ARTHROPATHY OF BOTH SHOULDERS: ICD-10-CM

## 2025-04-22 DIAGNOSIS — Z79.899 ENCOUNTER FOR DRUG THERAPY: ICD-10-CM

## 2025-04-22 DIAGNOSIS — M19.071 OSTEOARTHRITIS OF BOTH FEET, UNSPECIFIED OSTEOARTHRITIS TYPE: ICD-10-CM

## 2025-04-22 DIAGNOSIS — M15.0 PRIMARY OSTEOARTHRITIS INVOLVING MULTIPLE JOINTS: ICD-10-CM

## 2025-04-22 DIAGNOSIS — M19.072 OSTEOARTHRITIS OF BOTH FEET, UNSPECIFIED OSTEOARTHRITIS TYPE: ICD-10-CM

## 2025-04-22 DIAGNOSIS — M12.811 ROTATOR CUFF ARTHROPATHY OF BOTH SHOULDERS: ICD-10-CM

## 2025-04-22 DIAGNOSIS — L40.50 PSORIATIC ARTHRITIS (HCC): ICD-10-CM

## 2025-04-22 DIAGNOSIS — M79.18 MYOFASCIAL PAIN DYSFUNCTION SYNDROME: ICD-10-CM

## 2025-04-22 RX ORDER — CERTOLIZUMAB PEGOL 200 MG/ML
2 INJECTION, SOLUTION SUBCUTANEOUS
Qty: 4 EACH | Refills: 1 | Status: SHIPPED | OUTPATIENT
Start: 2025-04-22

## 2025-04-22 NOTE — TELEPHONE ENCOUNTER
LOV: 05/28/2024    Future Appointments   Date Time Provider Department Center   5/28/2025  9:40 AM Jannie Juarez MD [id number] [address]       LF: 04/14/2025    QTY: 1 Each    Refills: 0    LABS: BLOOD COUNT COMPLETE AUTO&AUTO DIFRNTL WBC  Order: [id number]   suggestion  Information displayed in this report may not trend or trigger automated decision support.     Component  Ref Range & Units 10 mo ago   WHITE BLOOD CELL(WBC) COUNT  3.4 - 10.8 x10E3/uL 5.9   RED BLOOD CELL (RBC) COUNT  3.77 - 5.28 x10E6/uL 4.71   HEMOGLOBIN  11.1 - 15.9 g/dL 14.3   HEMATOCRIT  34.0 - 46.6 % 43.2   MCV  79 - 97 fL 92   MCH  26.6 - 33.0 pg 30.4   MCHC  31.5 - 35.7 g/dL 33.1   RDW  11.7 - 15.4 % 13.3   PLATELETS  150 - 450 x10E3/uL 301   NEUTROPHILS  Not Estab. % 40   LYMPHS  Not Estab. % 49   MONOCYTES  Not Estab. % 7   EOS  Not Estab. % 3   BASOPHILS  Not Estab. % 1   NEUTROPHILS (ABSOLUTE)  1.4 - 7.0 x10E3/uL 2.3   LYMPHS (ABSOLUTE)  0.7 - 3.1 x10E3/uL 2.9   MONOCYTES(ABSOLUTE)  0.1 - 0.9 x10E3/uL 0.4   EOS (ABSOLUTE)  0.0 - 0.4 x10E3/uL 0.2   BASO (ABSOLUTE)  0.0 - 0.2 x10E3/uL 0.1   IMMATURE GRANULOCYTES  Not Estab. % 0   IMMATURE GRANS (ABS)  0.0 - 0.1 x10E3/uL 0.0     Specimen Collected: 06/19/24 10:54 AM    Performed by: [facility] Last Resulted: 06/20/24  2:07 AM   Received From: [facility]  Result Received: 11/13/24  8:58 AM    View Encounter        Received Information   Contains abnormal data COMPREHENSIVE METABOLIC PANEL  Order: [id number]   suggestion  Information displayed in this report may not trend or trigger automated decision support.     Component  Ref Range & Units 10 mo ago   GLUCOSE, SERUM  mg/dL CANCELED   Comment: Test not performed.  Serum was in contact with cells when received  which will make the result inaccurate.    Result canceled by the ancillary.   BUN  6 - 24 mg/dL 12   CREATININE, SERUM  0.57 - 1.00 mg/dL 0.65   eGFR  >59 mL/min/1.73 107   BUN/CREATININE RATIO  9 - 23 18   SODIUM, SERUM  134 - 144  mmol/L 141   POTASSIUM, SERUM  mmol/L CANCELED   Comment: Test not performed.  Serum was in contact with cells when received  which will make the result inaccurate.    Result canceled by the ancillary.   CHLORIDE, SERUM  96 - 106 mmol/L 101   CARBON DIOXIDE, TOTAL  20 - 29 mmol/L 23   CALCIUM, SERUM  8.7 - 10.2 mg/dL 9.5   PROTEIN, TOTAL, SERUM  6.0 - 8.5 g/dL 7.3   ALBUMIN, SERUM  3.9 - 4.9 g/dL 4.2   GLOBULIN, TOTAL  1.5 - 4.5 g/dL 3.1   BILIRUBIN, TOTAL  0.0 - 1.2 mg/dL 0.5   ALKALINE PHOSPHATASE, SERUM  44 - 121 IU/L 105   AST (SGOT)  0 - 40 IU/L 57 High    ALT (SGPT)  0 - 32 IU/L 77 High      Specimen Collected: 06/19/24 10:54 AM    Performed by: [facility] Last Resulted: 06/20/24  6:09 AM   Received From: [facility]  Result Received: 11/13/24  8:58 AM

## 2025-08-20 ENCOUNTER — OFFICE VISIT (OUTPATIENT)
Dept: RHEUMATOLOGY | Facility: CLINIC | Age: 50
End: 2025-08-20

## 2025-08-20 ENCOUNTER — LAB ENCOUNTER (OUTPATIENT)
Dept: LAB | Age: 50
End: 2025-08-20
Attending: INTERNAL MEDICINE

## 2025-08-20 VITALS
SYSTOLIC BLOOD PRESSURE: 138 MMHG | OXYGEN SATURATION: 95 % | RESPIRATION RATE: 16 BRPM | HEIGHT: 67 IN | WEIGHT: 177 LBS | DIASTOLIC BLOOD PRESSURE: 80 MMHG | TEMPERATURE: 97 F | BODY MASS INDEX: 27.78 KG/M2 | HEART RATE: 71 BPM

## 2025-08-20 DIAGNOSIS — M19.071 OSTEOARTHRITIS OF BOTH FEET, UNSPECIFIED OSTEOARTHRITIS TYPE: ICD-10-CM

## 2025-08-20 DIAGNOSIS — M15.0 PRIMARY OSTEOARTHRITIS INVOLVING MULTIPLE JOINTS: ICD-10-CM

## 2025-08-20 DIAGNOSIS — M19.072 OSTEOARTHRITIS OF BOTH FEET, UNSPECIFIED OSTEOARTHRITIS TYPE: ICD-10-CM

## 2025-08-20 DIAGNOSIS — M79.7 FIBROMYALGIA: ICD-10-CM

## 2025-08-20 DIAGNOSIS — L40.50 PSORIATIC ARTHRITIS (HCC): ICD-10-CM

## 2025-08-20 DIAGNOSIS — Z79.899 ENCOUNTER FOR DRUG THERAPY: ICD-10-CM

## 2025-08-20 DIAGNOSIS — L40.50 PSORIATIC ARTHRITIS (HCC): Primary | ICD-10-CM

## 2025-08-20 DIAGNOSIS — M12.812 ROTATOR CUFF ARTHROPATHY OF BOTH SHOULDERS: ICD-10-CM

## 2025-08-20 DIAGNOSIS — F32.0 CURRENT MILD EPISODE OF MAJOR DEPRESSIVE DISORDER WITHOUT PRIOR EPISODE: ICD-10-CM

## 2025-08-20 DIAGNOSIS — M79.18 MYOFASCIAL PAIN DYSFUNCTION SYNDROME: ICD-10-CM

## 2025-08-20 DIAGNOSIS — M12.811 ROTATOR CUFF ARTHROPATHY OF BOTH SHOULDERS: ICD-10-CM

## 2025-08-20 PROBLEM — F32.A DEPRESSION: Status: ACTIVE | Noted: 2025-08-20

## 2025-08-20 LAB
ALBUMIN SERPL-MCNC: 4.6 G/DL (ref 3.2–4.8)
ALBUMIN/GLOB SERPL: 1.5 (ref 1–2)
ALP LIVER SERPL-CCNC: 59 U/L (ref 41–108)
ALT SERPL-CCNC: 19 U/L (ref 10–49)
ANION GAP SERPL CALC-SCNC: 11 MMOL/L (ref 0–18)
AST SERPL-CCNC: 21 U/L (ref ?–34)
BASOPHILS # BLD AUTO: 0.03 X10(3) UL (ref 0–0.2)
BASOPHILS NFR BLD AUTO: 0.5 %
BILIRUB SERPL-MCNC: 0.6 MG/DL (ref 0.3–1.2)
BUN BLD-MCNC: 7 MG/DL (ref 9–23)
CALCIUM BLD-MCNC: 9.6 MG/DL (ref 8.7–10.6)
CHLORIDE SERPL-SCNC: 105 MMOL/L (ref 98–112)
CO2 SERPL-SCNC: 26 MMOL/L (ref 21–32)
CREAT BLD-MCNC: 0.77 MG/DL (ref 0.55–1.02)
EGFRCR SERPLBLD CKD-EPI 2021: 93 ML/MIN/1.73M2 (ref 60–?)
EOSINOPHIL # BLD AUTO: 0.15 X10(3) UL (ref 0–0.7)
EOSINOPHIL NFR BLD AUTO: 2.5 %
ERYTHROCYTE [DISTWIDTH] IN BLOOD BY AUTOMATED COUNT: 12.4 %
ERYTHROCYTE [SEDIMENTATION RATE] IN BLOOD: 23 MM/HR (ref 0–30)
FASTING STATUS PATIENT QL REPORTED: NO
GLOBULIN PLAS-MCNC: 3.1 G/DL (ref 2–3.5)
GLUCOSE BLD-MCNC: 85 MG/DL (ref 70–99)
HCT VFR BLD AUTO: 41.4 % (ref 35–48)
HGB BLD-MCNC: 13.8 G/DL (ref 12–16)
IMM GRANULOCYTES # BLD AUTO: 0.01 X10(3) UL (ref 0–1)
IMM GRANULOCYTES NFR BLD: 0.2 %
LYMPHOCYTES # BLD AUTO: 2.73 X10(3) UL (ref 1–4)
LYMPHOCYTES NFR BLD AUTO: 44.8 %
MCH RBC QN AUTO: 31 PG (ref 26–34)
MCHC RBC AUTO-ENTMCNC: 33.3 G/DL (ref 31–37)
MCV RBC AUTO: 93 FL (ref 80–100)
MONOCYTES # BLD AUTO: 0.5 X10(3) UL (ref 0.1–1)
MONOCYTES NFR BLD AUTO: 8.2 %
NEUTROPHILS # BLD AUTO: 2.68 X10 (3) UL (ref 1.5–7.7)
NEUTROPHILS # BLD AUTO: 2.68 X10(3) UL (ref 1.5–7.7)
NEUTROPHILS NFR BLD AUTO: 43.8 %
OSMOLALITY SERPL CALC.SUM OF ELEC: 291 MOSM/KG (ref 275–295)
PLATELET # BLD AUTO: 288 10(3)UL (ref 150–450)
POTASSIUM SERPL-SCNC: 3.8 MMOL/L (ref 3.5–5.1)
PROT SERPL-MCNC: 7.7 G/DL (ref 5.7–8.2)
RBC # BLD AUTO: 4.45 X10(6)UL (ref 3.8–5.3)
SODIUM SERPL-SCNC: 142 MMOL/L (ref 136–145)
URATE SERPL-MCNC: 5.4 MG/DL (ref 3.1–7.8)
WBC # BLD AUTO: 6.1 X10(3) UL (ref 4–11)

## 2025-08-20 PROCEDURE — 84550 ASSAY OF BLOOD/URIC ACID: CPT

## 2025-08-20 PROCEDURE — 36415 COLL VENOUS BLD VENIPUNCTURE: CPT

## 2025-08-20 PROCEDURE — 99214 OFFICE O/P EST MOD 30 MIN: CPT | Performed by: INTERNAL MEDICINE

## 2025-08-20 PROCEDURE — 85025 COMPLETE CBC W/AUTO DIFF WBC: CPT

## 2025-08-20 PROCEDURE — 80053 COMPREHEN METABOLIC PANEL: CPT

## 2025-08-20 PROCEDURE — 86480 TB TEST CELL IMMUN MEASURE: CPT

## 2025-08-20 PROCEDURE — 85652 RBC SED RATE AUTOMATED: CPT

## 2025-08-20 RX ORDER — CERTOLIZUMAB PEGOL 200 MG/ML
2 INJECTION, SOLUTION SUBCUTANEOUS
Qty: 1 EACH | Refills: 1 | Status: SHIPPED | OUTPATIENT
Start: 2025-08-20 | End: 2025-08-20

## 2025-08-20 RX ORDER — DICYCLOMINE HCL 20 MG
20 TABLET ORAL EVERY 4 HOURS PRN
COMMUNITY

## 2025-08-22 LAB
M TB IFN-G CD4+ T-CELLS BLD-ACNC: 0.04 IU/ML
M TB TUBERC IFN-G BLD QL: NEGATIVE
M TB TUBERC IGNF/MITOGEN IGNF CONTROL: >10 IU/ML
QFT TB1 AG MINUS NIL: 0 IU/ML
QFT TB2 AG MINUS NIL: 0.04 IU/ML